# Patient Record
Sex: MALE | Race: BLACK OR AFRICAN AMERICAN | NOT HISPANIC OR LATINO | URBAN - METROPOLITAN AREA
[De-identification: names, ages, dates, MRNs, and addresses within clinical notes are randomized per-mention and may not be internally consistent; named-entity substitution may affect disease eponyms.]

---

## 2023-08-01 ENCOUNTER — WEB ENCOUNTER (OUTPATIENT)
Dept: URBAN - METROPOLITAN AREA CLINIC 88 | Facility: CLINIC | Age: 23
End: 2023-08-01

## 2023-08-01 ENCOUNTER — DASHBOARD ENCOUNTERS (OUTPATIENT)
Age: 23
End: 2023-08-01

## 2023-08-01 ENCOUNTER — OFFICE VISIT (OUTPATIENT)
Dept: URBAN - METROPOLITAN AREA CLINIC 88 | Facility: CLINIC | Age: 23
End: 2023-08-01
Payer: COMMERCIAL

## 2023-08-01 VITALS
HEART RATE: 84 BPM | WEIGHT: 163.6 LBS | HEIGHT: 68 IN | DIASTOLIC BLOOD PRESSURE: 78 MMHG | SYSTOLIC BLOOD PRESSURE: 120 MMHG | TEMPERATURE: 98.1 F | BODY MASS INDEX: 24.8 KG/M2

## 2023-08-01 DIAGNOSIS — R11.2 INTRACTABLE NAUSEA AND VOMITING: ICD-10-CM

## 2023-08-01 DIAGNOSIS — R63.4 WEIGHT LOSS: ICD-10-CM

## 2023-08-01 DIAGNOSIS — R63.0 ANOREXIA: ICD-10-CM

## 2023-08-01 DIAGNOSIS — K21.9 GERD WITHOUT ESOPHAGITIS: ICD-10-CM

## 2023-08-01 PROBLEM — 79890006: Status: ACTIVE | Noted: 2023-08-01

## 2023-08-01 PROCEDURE — 99204 OFFICE O/P NEW MOD 45 MIN: CPT | Performed by: NURSE PRACTITIONER

## 2023-08-01 RX ORDER — SERTRALINE 50 MG/1
1 TABLET TABLET, FILM COATED ORAL ONCE A DAY
Status: ACTIVE | COMMUNITY

## 2023-08-01 RX ORDER — ONDANSETRON 2 MG/ML
AS DIRECTED INJECTION, SOLUTION INTRAMUSCULAR; INTRAVENOUS
OUTPATIENT

## 2023-08-01 RX ORDER — LORAZEPAM 0.5 MG/1
1 TABLET AT BEDTIME AS NEEDED TABLET ORAL ONCE A DAY
Status: ACTIVE | COMMUNITY

## 2023-08-01 RX ORDER — FLUTICASONE PROPIONATE 50 UG/1
1 SPRAY IN EACH NOSTRIL SPRAY, METERED NASAL ONCE A DAY
Status: ACTIVE | COMMUNITY

## 2023-08-01 RX ORDER — ONDANSETRON 2 MG/ML
AS DIRECTED INJECTION, SOLUTION INTRAMUSCULAR; INTRAVENOUS
Status: ACTIVE | COMMUNITY

## 2023-08-01 RX ORDER — CYPROHEPTADINE HYDROCHLORIDE 2 MG/5ML
5 ML SYRUP ORAL
OUTPATIENT

## 2023-08-01 RX ORDER — CYPROHEPTADINE HYDROCHLORIDE 2 MG/5ML
5 ML SYRUP ORAL
Status: ACTIVE | COMMUNITY

## 2023-08-01 RX ORDER — LANSOPRAZOLE 30 MG/1
1 TABLET TABLET, ORALLY DISINTEGRATING, DELAYED RELEASE ORAL ONCE A DAY
OUTPATIENT

## 2023-08-01 RX ORDER — FOLIC ACID 1 MG/1
1 TABLET TABLET ORAL ONCE A DAY
Status: ACTIVE | COMMUNITY

## 2023-08-01 RX ORDER — LANSOPRAZOLE 30 MG/1
1 TABLET TABLET, ORALLY DISINTEGRATING, DELAYED RELEASE ORAL ONCE A DAY
Status: ACTIVE | COMMUNITY

## 2023-08-01 RX ORDER — GLUCOSAMINE/MSM/CHONDROIT SULF 500-166.6
AS DIRECTED TABLET ORAL
Status: ACTIVE | COMMUNITY

## 2023-08-01 RX ORDER — LEVOCETIRIZINE DIHYDROCHLORIDE 0.5 MG/ML
5 ML IN THE EVENING SOLUTION ORAL ONCE A DAY
Status: ACTIVE | COMMUNITY

## 2023-08-01 NOTE — HPI-TODAY'S VISIT:
Patient presents today to establish GI care after relocating to the area from [place] to live with parents.  Mother is also present providing information given hx of Wernicke's syndrome.  States started to experience progressive nausea, occasional vomiting and diarrhea that started in October 2023.  Nausea and vomiting occured with "everything".  AS symptoms progressed, evaluated in ED and Urgent care centers while living in [place], [place].   was discharged home on various regimens, including Zofran, omeprazole and promethazine and Elavil, to manage symptoms.  Mother states that patient was using marijuana occasionally to daily as symptoms of nausea increased.  States cannabinoid hyperemesis was suspected so discontinued use.  However, symptoms continued.  Experienced a 60-70 lb weight loss since October 2023 due to his symptoms.  Diagnosed with Wernieckie's syndrome due to malnutrition on 02/09/2023 (his last hospitalization in [place]).   Eventually evaluated by gastrtoenterology while living in [place], [place].   RUQ US 01/11/2023:  fatty liver and normal gallbladder   HIDA w/EF 01/13/2023:  biliary hyperkinesia CT A/P 01/17/2023:  fatty liver, small amount of gallbladder sludge. Underwent EGD and colonoscopy in January 2023.  EGD:  normal esophagus, normal stomach (neg HP), normal duodenum (benign).  Colonoscopy:  erythema TI and sigmoid colon and normal colon.  Random biopsies were obtained.  Copy of this pathology report was not available at time of OV.   Eventually, discovered that symptoms were due to various medications that mother states contained lactose.  As medication changes were made symptoms improved.  Also evaluated by general surgeon who did not feel cholecystectomy would be beneficial.      Diarrhea evetually resolved as well, which patient feels was first symptoms to resolve due to decrease in oral intake.  Currently defecation occurs 1-2 times a day with stools described as being formed. Remains on lansoprazole 30 mg and Zofran daily.  Started on appetitie stimulatnt as well with weight remaining stable.  Mother has questions about daily use of Zofran.  Patient feels takes medication out of fear to avoid return of nausea/vomiting.  Referred to a psychologist and psychiatrist due to increased anxiety due to his health issues.  Remains on daily medication to manage this.  Also followed by allergist given hx of IgA and thiamine deficiencies.  Overall, feels health status from GI standpoint is manageable.

## 2023-08-01 NOTE — PHYSICAL EXAM CONSTITUTIONAL:
well developed, well nourished , in no acute distress , ambulating without difficulty with cane, normal communication ability